# Patient Record
Sex: FEMALE | Race: WHITE | NOT HISPANIC OR LATINO | Employment: FULL TIME | URBAN - METROPOLITAN AREA
[De-identification: names, ages, dates, MRNs, and addresses within clinical notes are randomized per-mention and may not be internally consistent; named-entity substitution may affect disease eponyms.]

---

## 2020-08-19 ENCOUNTER — DOCUMENTATION ONLY (OUTPATIENT)
Dept: NEUROLOGY | Facility: HOSPITAL | Age: 50
End: 2020-08-19

## 2020-08-19 ENCOUNTER — TELEPHONE (OUTPATIENT)
Dept: NEUROLOGY | Facility: HOSPITAL | Age: 50
End: 2020-08-19

## 2020-08-19 DIAGNOSIS — C7A.8 PRIMARY MALIGNANT NEUROENDOCRINE NEOPLASM OF COLON: Primary | ICD-10-CM

## 2020-08-19 NOTE — PROGRESS NOTES
New NET Small Bowel dx 7/2020 referred by Dr. Faiza Luther. Patient presented in ER with nausea, vomiting and abdominal pain. The patient had a CT scan showed a small bowel blockage. The patient underwent a lap colectomy and the mass we confirmed as NET Well Dif Ki67 2%. Patient states she is feeling better but still having some issues with eating and abdominal pain.Patient has been scheduled for scans, blood work and clinic appointment next week and verbalized understanding.

## 2020-08-19 NOTE — TELEPHONE ENCOUNTER
----- Message from Kathy Degroot sent at 8/19/2020 11:55 AM CDT -----  This referral was sent this morning and the notes have been scanned into media. The patient had a FDG PET and I was not sure if you would like a GA68 as well or blood work. The physician has sent the materials to pathology department already so they will need a path order put in as well. Please let me know if you would like additional testing done prior to an appointment.   ----- Message -----  From: Alexys Hooks  Sent: 8/19/2020  10:30 AM CDT  To: [facility] Neuroendocrine Clinical Support    Dr. Dayami Paige would like to refer the following pt to the NET program for a second opinion right colon mass. The referral and records are scanned into . For any inquires regarding the pt's referral, please contact the referring office at [phone].    Thank you   Alexys Gorman

## 2020-08-20 ENCOUNTER — DOCUMENTATION ONLY (OUTPATIENT)
Dept: NEUROLOGY | Facility: HOSPITAL | Age: 50
End: 2020-08-20

## 2020-08-24 ENCOUNTER — TELEPHONE (OUTPATIENT)
Dept: NEUROLOGY | Facility: HOSPITAL | Age: 50
End: 2020-08-24

## 2020-08-24 NOTE — TELEPHONE ENCOUNTER
----- Message from Kika Rodriguez sent at 8/24/2020 10:26 AM CDT -----  Contact: self [phone]  RR  - Patient is calling to reschedule her appointment. Please call

## 2020-09-01 ENCOUNTER — HOSPITAL ENCOUNTER (OUTPATIENT)
Dept: RADIOLOGY | Facility: HOSPITAL | Age: 50
Discharge: HOME | End: 2020-09-01
Attending: INTERNAL MEDICINE
Payer: COMMERCIAL

## 2020-09-01 ENCOUNTER — OFFICE VISIT (OUTPATIENT)
Dept: NEUROLOGY | Facility: HOSPITAL | Age: 50
End: 2020-09-01
Attending: INTERNAL MEDICINE
Payer: COMMERCIAL

## 2020-09-01 VITALS
TEMPERATURE: 98 F | BODY MASS INDEX: 25.87 KG/M2 | HEART RATE: 87 BPM | WEIGHT: 128.31 LBS | OXYGEN SATURATION: 99 % | HEIGHT: 59 IN | SYSTOLIC BLOOD PRESSURE: 106 MMHG | DIASTOLIC BLOOD PRESSURE: 69 MMHG

## 2020-09-01 DIAGNOSIS — R10.84 GENERALIZED ABDOMINAL PAIN: ICD-10-CM

## 2020-09-01 DIAGNOSIS — K59.00 CONSTIPATION, UNSPECIFIED CONSTIPATION TYPE: ICD-10-CM

## 2020-09-01 DIAGNOSIS — C7A.8 PRIMARY MALIGNANT NEUROENDOCRINE NEOPLASM OF COLON: ICD-10-CM

## 2020-09-01 DIAGNOSIS — C7A.012 MALIGNANT CARCINOID TUMOR OF ILEUM: ICD-10-CM

## 2020-09-01 PROCEDURE — 78815 NM PET 68GA DOTATATE WHOLE BODY: ICD-10-PCS | Mod: 26,PI,, | Performed by: RADIOLOGY

## 2020-09-01 PROCEDURE — 99215 OFFICE O/P EST HI 40 MIN: CPT | Mod: 25 | Performed by: INTERNAL MEDICINE

## 2020-09-01 PROCEDURE — A9587 GALLIUM GA-68: HCPCS

## 2020-09-01 PROCEDURE — 3008F PR BODY MASS INDEX (BMI) DOCUMENTED: ICD-10-PCS | Mod: CPTII,,, | Performed by: INTERNAL MEDICINE

## 2020-09-01 PROCEDURE — 3008F BODY MASS INDEX DOCD: CPT | Mod: CPTII,,, | Performed by: INTERNAL MEDICINE

## 2020-09-01 PROCEDURE — 78815 PET IMAGE W/CT SKULL-THIGH: CPT | Mod: 26,PI,, | Performed by: RADIOLOGY

## 2020-09-01 PROCEDURE — 99204 PR OFFICE/OUTPT VISIT, NEW, LEVL IV, 45-59 MIN: ICD-10-PCS | Mod: ,,, | Performed by: INTERNAL MEDICINE

## 2020-09-01 PROCEDURE — 99204 OFFICE O/P NEW MOD 45 MIN: CPT | Mod: ,,, | Performed by: INTERNAL MEDICINE

## 2020-09-01 PROCEDURE — 78815 PET IMAGE W/CT SKULL-THIGH: CPT | Mod: TC

## 2020-09-01 RX ORDER — FAMOTIDINE 10 MG/1
10 TABLET ORAL
COMMUNITY
End: 2021-07-26

## 2020-09-01 RX ORDER — ONDANSETRON 4 MG/1
TABLET, FILM COATED ORAL
COMMUNITY
Start: 2020-08-31 | End: 2022-01-31

## 2020-09-01 RX ORDER — TRAMADOL HYDROCHLORIDE 50 MG/1
TABLET ORAL
COMMUNITY
Start: 2020-08-31 | End: 2021-02-15

## 2020-09-01 RX ORDER — ACETAMINOPHEN 500 MG
1000 TABLET ORAL EVERY 6 HOURS PRN
COMMUNITY

## 2020-09-01 NOTE — LETTER
September 1, 2020        Elpidio Luther MD  [address]  [facility]  [place] [address]             [facility] [address] [place] [address]  Phone: [phone]  Fax: [phone]   Patient: Kathy Negro   MR Number: [id number]   YOB: 1970   Date of Visit: 9/1/2020       Dear Dr. Luther:    Thank you for referring Kathy Negro to me for evaluation. Attached you will find relevant portions of my assessment and plan of care.    If you have questions, please do not hesitate to call me. I look forward to following Kathy Negro along with you.    Sincerely,      Patrick Quintanilla DO, FACP            CC  No Recipients    Enclosure

## 2020-09-01 NOTE — PROGRESS NOTES
NOLANETS:  [facility]  A collaboration between [facility] and [facility]    PATIENT: Kathy Negro  MRN: [id number]  DATE: 9/1/2020      Diagnosis:   1. Malignant carcinoid tumor of ileum    2. Generalized abdominal pain    3. Constipation, unspecified constipation type        Chief Complaint: Carcinoid Tumor      Oncologic History:      Oncologic History Small intestine neuroendocrine tumor diagnosed 07/2020    Oncologic Treatment Ileocolic resection 07/2020    Pathology 07/2020:  Ileocolic resection-neuroendocrine tumor, grade 1 (carcinoid), Ki-67 2% T3, N1          Subjective:    Interval History: Ms. Negro is a 50 y.o. female who is seen as an initial visit for a small intestine neuroendocrine tumor.  Her history dates to 7/2020 when she sought medical attention for several days of abdominal pain, nausea and vomiting.  She underwent a CT scan which showed findings consistent with a partial small bowel obstruction.  There is a mass in the ileocecal valve measuring 2.1 cm.  She underwent a laparoscopic ileocolic resection with pathology showing a 2.3 cm neuroendocrine tumor, grade 1 with Ki-67 2% 6 out of 14 lymph nodes were positive for metastatic disease.  She underwent a gallium 68 PET-CT in 09/2020 which did not show any evidence of a somatostatin receptor avid tumor.    She states that since her surgery she is had some ongoing, intermittent abdominal pain in periodic nausea.  The pain was severe enough yesterday that she went to the ER and underwent a CT scan which was nonrevealing.  She does complain about constipation and her last bowel movement was 4 days ago.  Looking back, she had had abdominal pain, intermittently for the past 2 years.  She did not have any symptoms of diarrhea, flushing or wheezing.  Weight has been stable.  She has no other new complaints.        Past Medical History:   Past Medical  History:   Diagnosis Date    Constipation 9/1/2020    Generalized abdominal pain 9/1/2020    Malignant carcinoid tumor of ileum 9/1/2020       Past Surgical HIstory:   Past Surgical History:   Procedure Laterality Date    LAPAROSCOPIC COLECTOMY  07/2020       Family History:   Family History   Adopted: Yes       Social History:  reports that she has never smoked. She has never used smokeless tobacco. She reports previous alcohol use. She reports that she does not use drugs.    Allergies:  Review of patient's allergies indicates:  No Known Allergies    Medications:  Current Outpatient Medications   Medication Sig Dispense Refill    acetaminophen (TYLENOL) 500 MG tablet Take 1,000 mg by mouth every 6 (six) hours as needed.      famotidine (PEPCID) 10 MG tablet Take 10 mg by mouth.      ondansetron (ZOFRAN) 4 MG tablet       traMADoL (ULTRAM) 50 mg tablet        No current facility-administered medications for this visit.        Review of Systems   Constitutional: Negative for chills, fever and unexpected weight change.   HENT: Negative for congestion, hearing loss and nosebleeds.    Eyes: Negative for visual disturbance.   Respiratory: Negative for cough and shortness of breath.    Cardiovascular: Negative for chest pain and palpitations.   Gastrointestinal: Positive for abdominal pain and constipation. Negative for blood in stool, diarrhea, nausea and vomiting.   Genitourinary: Negative for dysuria.   Musculoskeletal: Negative for back pain and gait problem.   Skin: Negative for color change and rash.   Neurological: Negative for dizziness, weakness and headaches.   Hematological: Negative for adenopathy. Does not bruise/bleed easily.   Psychiatric/Behavioral: Negative for confusion.       ECOG Performance Status: 0   Objective:      Vitals:   Vitals:    09/01/20 1551   BP: 106/69   BP Location: Right arm   Patient Position: Sitting   BP Method: Medium (Automatic)   Pulse: 87   Temp: 97.9 °F (36.6 °C)  "  TempSrc: Oral   SpO2: 99%   Weight: 58.2 kg (128 lb 4.9 oz)   Height: 4' 11" (1.499 m)     BMI: Body mass index is 25.91 kg/m².    Physical Exam  Constitutional:       General: She is not in acute distress.     Appearance: She is well-developed.   HENT:      Head: Normocephalic.      Mouth/Throat:      Pharynx: No oropharyngeal exudate.   Eyes:      General: No scleral icterus.  Neck:      Musculoskeletal: Neck supple.      Thyroid: No thyromegaly.      Trachea: No tracheal deviation.   Cardiovascular:      Rate and Rhythm: Normal rate and regular rhythm.   Pulmonary:      Effort: Pulmonary effort is normal. No respiratory distress.      Breath sounds: Normal breath sounds. No wheezing or rales.   Abdominal:      General: There is no distension.      Palpations: Abdomen is soft. There is no mass.      Tenderness: There is no abdominal tenderness. There is no guarding or rebound.   Musculoskeletal: Normal range of motion.   Lymphadenopathy:      Cervical: No cervical adenopathy.   Skin:     General: Skin is warm and dry.   Neurological:      Mental Status: She is alert and oriented to person, place, and time.      Cranial Nerves: No cranial nerve deficit.         Laboratory Data:  No results found for any previous visit.       Imaging:     Gallium 68 PET-CT 09/01/2020  COMPARISON:  Outside CT abdomen pelvis with contrast 08/31/2020 (report only); outside FDG PET-CT 08/07/2020 (report only)     FINDINGS:  Quality of the study: Adequate.     Physiologic distribution of the tracer is seen within the pituitary, salivary, and thyroid glands, stomach, liver, pancreas uncinate process, spleen, adrenal glands, and bowel as well as within the  tract, noting pooling within the right ureter.  There is faint radiotracer uptake in the right upper quadrant near the site of prior surgical resection without focal CT correlate.     Incidental CT findings: Postoperative changes of previous ileal and right partial hemicolonic " resection.  Probable hepatic steatosis.  Hyperattenuating material within the gallbladder favored to represent vicarious excretion of contrast related to recent outside contrast exam.  1.4 cm hypodense left adnexal cystic lesion.     Impression:     No PET/CT findings to suggest somatostatin receptor avid disease.            Assessment:       1. Malignant carcinoid tumor of ileum    2. Generalized abdominal pain    3. Constipation, unspecified constipation type           Plan:     I have had a long conversation with Mrs. Negro concerning her disease.  She has a small intestine neuroendocrine tumor which was completely resected in 7/2020.  Her preop scans and scans today showed no evidence of distant disease.  Her pathology did indicate a grade 1 neuroendocrine tumor with Ki-67 of 2%.  There are additional findings on the pathology report indicating this is a large cell neuroendocrine carcinoma, however, I suspect this was an error.  I will have her pathology reviewed at our institution.  Additionally, we did draw neuroendocrine specific tumor markers today that we will follow-up on.  She is having ongoing abdominal pain which I do think is likely related to constipation.  She is on tramadol and Zofran which I have told her can cause significant constipation.  I have recommended an aggressive bowel regimen with a stool softener and laxative and she may need follow-up with GI should she have ongoing symptoms.  I have told her she will need ongoing surveillance for her neuroendocrine tumor and would recommend we repeat a CT and tumor markers in another 6 months and we will plan to see her back at that time.  She is going to follow back up with Dr. Luther.  Report any new symptoms in the interim.  All questions were answered and she is agreeable with this plan.      Patrick Quintanilla DO, FACP  Hematology & Oncology, [facility]  [address]  [place]  [address]  ph.  [phone]; [phone]  fax. [phone]      45 minutes were spent in coordination of patient's care, record review and counseling.  More than 50% of the time was face-to-face.

## 2020-09-02 LAB

## 2020-10-09 ENCOUNTER — TELEPHONE (OUTPATIENT)
Dept: NEUROLOGY | Facility: HOSPITAL | Age: 50
End: 2020-10-09

## 2020-10-14 ENCOUNTER — TELEPHONE (OUTPATIENT)
Dept: NEUROLOGY | Facility: HOSPITAL | Age: 50
End: 2020-10-14

## 2021-01-08 ENCOUNTER — TELEPHONE (OUTPATIENT)
Dept: NEUROLOGY | Facility: HOSPITAL | Age: 51
End: 2021-01-08

## 2021-01-11 ENCOUNTER — TELEPHONE (OUTPATIENT)
Dept: NEUROLOGY | Facility: HOSPITAL | Age: 51
End: 2021-01-11

## 2021-02-12 ENCOUNTER — TELEPHONE (OUTPATIENT)
Dept: NEUROLOGY | Facility: HOSPITAL | Age: 51
End: 2021-02-12

## 2021-02-15 ENCOUNTER — TELEPHONE (OUTPATIENT)
Dept: NEUROLOGY | Facility: HOSPITAL | Age: 51
End: 2021-02-15

## 2021-02-15 ENCOUNTER — OFFICE VISIT (OUTPATIENT)
Dept: NEUROLOGY | Facility: HOSPITAL | Age: 51
End: 2021-02-15
Attending: INTERNAL MEDICINE
Payer: COMMERCIAL

## 2021-02-15 ENCOUNTER — HOSPITAL ENCOUNTER (OUTPATIENT)
Dept: RADIOLOGY | Facility: HOSPITAL | Age: 51
Discharge: HOME | End: 2021-02-15
Attending: INTERNAL MEDICINE
Payer: COMMERCIAL

## 2021-02-15 VITALS
HEART RATE: 65 BPM | WEIGHT: 140.13 LBS | SYSTOLIC BLOOD PRESSURE: 110 MMHG | TEMPERATURE: 98 F | HEIGHT: 59 IN | BODY MASS INDEX: 28.25 KG/M2 | DIASTOLIC BLOOD PRESSURE: 74 MMHG | OXYGEN SATURATION: 99 %

## 2021-02-15 DIAGNOSIS — C7A.012 MALIGNANT CARCINOID TUMOR OF ILEUM: Primary | ICD-10-CM

## 2021-02-15 PROCEDURE — 25500020 PHARM REV CODE 255: Performed by: INTERNAL MEDICINE

## 2021-02-15 PROCEDURE — 99213 OFFICE O/P EST LOW 20 MIN: CPT | Mod: ,,, | Performed by: INTERNAL MEDICINE

## 2021-02-15 PROCEDURE — 3008F BODY MASS INDEX DOCD: CPT | Mod: ,,, | Performed by: INTERNAL MEDICINE

## 2021-02-15 PROCEDURE — 74177 CT ABD & PELVIS W/CONTRAST: CPT | Mod: 26,,, | Performed by: INTERNAL MEDICINE

## 2021-02-15 PROCEDURE — 99213 PR OFFICE/OUTPT VISIT, EST, LEVL III, 20-29 MIN: ICD-10-PCS | Mod: ,,, | Performed by: INTERNAL MEDICINE

## 2021-02-15 PROCEDURE — 99214 OFFICE O/P EST MOD 30 MIN: CPT | Mod: 25 | Performed by: INTERNAL MEDICINE

## 2021-02-15 PROCEDURE — 74177 CT ABD & PELVIS W/CONTRAST: CPT | Mod: TC

## 2021-02-15 PROCEDURE — 3008F PR BODY MASS INDEX (BMI) DOCUMENTED: ICD-10-PCS | Mod: ,,, | Performed by: INTERNAL MEDICINE

## 2021-02-15 PROCEDURE — 1126F AMNT PAIN NOTED NONE PRSNT: CPT | Mod: ,,, | Performed by: INTERNAL MEDICINE

## 2021-02-15 PROCEDURE — 1126F PR PAIN SEVERITY QUANTIFIED, NO PAIN PRESENT: ICD-10-PCS | Mod: ,,, | Performed by: INTERNAL MEDICINE

## 2021-02-15 PROCEDURE — 74177 CT ABDOMEN PELVIS WITH CONTRAST: ICD-10-PCS | Mod: 26,,, | Performed by: INTERNAL MEDICINE

## 2021-02-15 RX ORDER — DOCUSATE SODIUM 100 MG/1
100 CAPSULE, LIQUID FILLED ORAL
COMMUNITY
End: 2022-01-31

## 2021-02-15 RX ADMIN — IOHEXOL 75 ML: 350 INJECTION, SOLUTION INTRAVENOUS at 08:02

## 2021-03-09 ENCOUNTER — PATIENT MESSAGE (OUTPATIENT)
Dept: NEUROLOGY | Facility: HOSPITAL | Age: 51
End: 2021-03-09

## 2021-03-10 ENCOUNTER — TELEPHONE (OUTPATIENT)
Dept: NEUROLOGY | Facility: HOSPITAL | Age: 51
End: 2021-03-10

## 2021-03-10 DIAGNOSIS — C7A.012 MALIGNANT CARCINOID TUMOR OF ILEUM: Primary | ICD-10-CM

## 2021-07-26 ENCOUNTER — HOSPITAL ENCOUNTER (OUTPATIENT)
Dept: RADIOLOGY | Facility: HOSPITAL | Age: 51
Discharge: HOME | End: 2021-07-26
Attending: SURGERY
Payer: COMMERCIAL

## 2021-07-26 ENCOUNTER — OFFICE VISIT (OUTPATIENT)
Dept: NEUROLOGY | Facility: HOSPITAL | Age: 51
End: 2021-07-26
Attending: SURGERY
Payer: COMMERCIAL

## 2021-07-26 DIAGNOSIS — C7A.012 MALIGNANT CARCINOID TUMOR OF ILEUM: ICD-10-CM

## 2021-07-26 DIAGNOSIS — C26.9 MALIGNANT NEOPLASM OF ILL-DEFINED SITES WITHIN THE DIGESTIVE SYSTEM: ICD-10-CM

## 2021-07-26 DIAGNOSIS — C7B.8 SECONDARY NEUROENDOCRINE TUMOR OF DISTANT LYMPH NODES: ICD-10-CM

## 2021-07-26 DIAGNOSIS — C7A.095 MALIGNANT CARCINOID TUMOR OF MIDGUT: Primary | ICD-10-CM

## 2021-07-26 PROCEDURE — A9585 GADOBUTROL INJECTION: HCPCS | Performed by: SURGERY

## 2021-07-26 PROCEDURE — 74183 MRI ABD W/O CNTR FLWD CNTR: CPT | Mod: 26,,, | Performed by: RADIOLOGY

## 2021-07-26 PROCEDURE — 74183 MRI ABD W/O CNTR FLWD CNTR: CPT | Mod: TC

## 2021-07-26 PROCEDURE — 25500020 PHARM REV CODE 255: Performed by: SURGERY

## 2021-07-26 PROCEDURE — 74183 MRI ABDOMEN W WO CONTRAST: ICD-10-PCS | Mod: 26,,, | Performed by: RADIOLOGY

## 2021-07-26 RX ORDER — GADOBUTROL 604.72 MG/ML
10 INJECTION INTRAVENOUS
Status: COMPLETED | OUTPATIENT
Start: 2021-07-26 | End: 2021-07-26

## 2021-07-26 RX ADMIN — GADOBUTROL 10 ML: 604.72 INJECTION INTRAVENOUS at 10:07

## 2021-07-28 ENCOUNTER — PATIENT MESSAGE (OUTPATIENT)
Dept: NEUROLOGY | Facility: HOSPITAL | Age: 51
End: 2021-07-28

## 2021-07-28 LAB

## 2022-01-31 ENCOUNTER — TELEPHONE (OUTPATIENT)
Dept: NEUROLOGY | Facility: HOSPITAL | Age: 52
End: 2022-01-31
Payer: COMMERCIAL

## 2022-01-31 ENCOUNTER — OFFICE VISIT (OUTPATIENT)
Dept: NEUROLOGY | Facility: HOSPITAL | Age: 52
End: 2022-01-31
Attending: SURGERY
Payer: COMMERCIAL

## 2022-01-31 ENCOUNTER — HOSPITAL ENCOUNTER (OUTPATIENT)
Dept: RADIOLOGY | Facility: HOSPITAL | Age: 52
Discharge: HOME | End: 2022-01-31
Attending: SURGERY
Payer: COMMERCIAL

## 2022-01-31 VITALS
HEIGHT: 59 IN | RESPIRATION RATE: 20 BRPM | WEIGHT: 138.44 LBS | SYSTOLIC BLOOD PRESSURE: 123 MMHG | HEART RATE: 77 BPM | BODY MASS INDEX: 27.91 KG/M2 | DIASTOLIC BLOOD PRESSURE: 73 MMHG | TEMPERATURE: 99 F

## 2022-01-31 DIAGNOSIS — C7B.8 SECONDARY NEUROENDOCRINE TUMOR OF DISTANT LYMPH NODES: ICD-10-CM

## 2022-01-31 DIAGNOSIS — C7A.095 MALIGNANT CARCINOID TUMOR OF MIDGUT: Primary | ICD-10-CM

## 2022-01-31 DIAGNOSIS — C26.9 MALIGNANT NEOPLASM OF ILL-DEFINED SITES WITHIN THE DIGESTIVE SYSTEM: ICD-10-CM

## 2022-01-31 LAB
CREAT SERPL-MCNC: 0.7 MG/DL (ref 0.5–1.4)
SAMPLE: NORMAL

## 2022-01-31 PROCEDURE — 25500020 PHARM REV CODE 255: Performed by: SURGERY

## 2022-01-31 PROCEDURE — 74177 CT ABDOMEN PELVIS WITH CONTRAST: ICD-10-PCS | Mod: 26,,, | Performed by: RADIOLOGY

## 2022-01-31 PROCEDURE — 74177 CT ABD & PELVIS W/CONTRAST: CPT | Mod: 26,,, | Performed by: RADIOLOGY

## 2022-01-31 PROCEDURE — A9698 NON-RAD CONTRAST MATERIALNOC: HCPCS | Performed by: SURGERY

## 2022-01-31 PROCEDURE — 99213 OFFICE O/P EST LOW 20 MIN: CPT | Mod: 25 | Performed by: SURGERY

## 2022-01-31 PROCEDURE — 74177 CT ABD & PELVIS W/CONTRAST: CPT | Mod: TC

## 2022-01-31 RX ORDER — METOCLOPRAMIDE 10 MG/1
10 TABLET ORAL
Qty: 120 TABLET | Refills: 0 | Status: SHIPPED | OUTPATIENT
Start: 2022-01-31 | End: 2022-03-02

## 2022-01-31 RX ADMIN — IOHEXOL 1000 ML: 9 SOLUTION ORAL at 10:01

## 2022-01-31 RX ADMIN — IOHEXOL 75 ML: 350 INJECTION, SOLUTION INTRAVENOUS at 11:01

## 2022-01-31 NOTE — LETTER
January 31, 2022    Kathy Negro  [address] [place] [address]       NOLANETS: [facility]  A collaboration between [facility] and [facility]                [address]  [address]  [place] [address]  Phone: [phone]  Fax: [phone]      URIEL Corona M.D., FACS  Michael Medina M.D.   Moe Pina M.D., FACS  DO Kathy Pereira M.D., MPH, FACS           Re: Kathy Negro     To Whom It May Concern.    Ms Kathy Negro was at her appointment today 1/31/2022 at 200 pm  Please excuse her from work.    Sincerely,             Kathy Noyola M.D., MPH, FACS  Professor of Surgery, [facility]

## 2022-01-31 NOTE — PROGRESS NOTES
NOLANETS:  [facility]  A collaboration between [facility] and [facility]    PATIENT: Kathy Negro  MRN: [id number]  DATE: 1/31/2022    Diagnosis:   Node positive midgut NET    Chief Complaint: node positive midgut NET    Oncologic History:      Oncologic History Small intestine neuroendocrine tumor diagnosed 07/2020    Oncologic Treatment Ileocolic resection 07/2020    Pathology 07/2020:  Ileocolic resection-neuroendocrine tumor, grade 1 (carcinoid), Ki-67 2% T3, N1 (6/14)            Subjective:    Interval History: Ms. Negro is a 51 y.o. female who is seen in follow-up for a small intestine neuroendocrine tumor.  She states that she still has some ongoing, intermittent constipation but does have a bowel regimen that works for her when this happens.  She denies any from the abdominal pain.  She is otherwise active without new complaints.    Her history dates to 7/2020 when she sought medical attention for several days of abdominal pain, nausea and vomiting.  She underwent a CT scan which showed findings consistent with a partial small bowel obstruction.  There is a mass in the ileocecal valve measuring 2.1 cm.  She underwent a laparoscopic ileocolic resection with pathology showing a 2.3 cm neuroendocrine tumor, grade 1 with Ki-67 2% 6 out of 14 lymph nodes were positive for metastatic disease.  She underwent a gallium 68 PET-CT in 09/2020 which did not show any evidence of a somatostatin receptor avid tumor.        Past Medical History:   Past Medical History:   Diagnosis Date    Constipation 9/1/2020    Generalized abdominal pain 9/1/2020    Malignant carcinoid tumor of ileum 9/1/2020       Past Surgical HIstory:   Past Surgical History:   Procedure Laterality Date    LAPAROSCOPIC COLECTOMY  07/2020       Family History:   Family History   Adopted: Yes       Social History:  reports that she has never smoked. She  has never used smokeless tobacco. She reports previous alcohol use. She reports that she does not use drugs.    Allergies:  Review of patient's allergies indicates:  No Known Allergies    Medications:  Current Outpatient Medications   Medication Sig Dispense Refill    acetaminophen (TYLENOL) 500 MG tablet Take 1,000 mg by mouth every 6 (six) hours as needed.      docusate sodium (COLACE) 100 MG capsule Take 100 mg by mouth.      ondansetron (ZOFRAN) 4 MG tablet        No current facility-administered medications for this visit.       Review of Systems   Constitutional: Negative for chills, fever and unexpected weight change.   HENT: Negative for congestion, hearing loss and nosebleeds.    Eyes: Negative for visual disturbance.   Respiratory: Negative for cough and shortness of breath.    Cardiovascular: Negative for chest pain and palpitations.   Gastrointestinal: Positive for constipation. Negative for abdominal pain, blood in stool, diarrhea, nausea and vomiting.   Genitourinary: Negative for dysuria.   Musculoskeletal: Negative for back pain and gait problem.   Skin: Negative for color change and rash.   Neurological: Negative for dizziness, weakness and headaches.   Hematological: Negative for adenopathy. Does not bruise/bleed easily.   Psychiatric/Behavioral: Negative for confusion.       ECOG Performance Status: 0   Objective:      Vitals:   There were no vitals filed for this visit.  BMI: There is no height or weight on file to calculate BMI.    Physical Exam  Constitutional:       General: She is not in acute distress.     Appearance: Normal appearance. She is well-developed and normal weight. She is not ill-appearing, toxic-appearing or diaphoretic.   HENT:      Head: Normocephalic and atraumatic.   Eyes:      General: No scleral icterus.  Neck:      Thyroid: No thyromegaly.      Trachea: No tracheal deviation.   Cardiovascular:      Rate and Rhythm: Normal rate and regular rhythm.   Pulmonary:       Effort: Pulmonary effort is normal. No respiratory distress.      Breath sounds: No stridor.   Abdominal:      Palpations: Abdomen is soft.   Musculoskeletal:         General: No swelling.   Skin:     General: Skin is warm and dry.   Neurological:      Mental Status: She is alert and oriented to person, place, and time.   Psychiatric:         Mood and Affect: Mood normal.         Behavior: Behavior normal.         Thought Content: Thought content normal.         Judgment: Judgment normal.         Laboratory Data:  Neuroendocrine Labs Latest Ref Rng & Units 1/31/2022   GLUCOSE 70 - 110 mg/dL 87   BUN 6 - 20 mg/dL 9   CREATININE 0.5 - 1.4 mg/dL 0.7    - 145 mmol/L 138   K 3.5 - 5.1 mmol/L 4.4   CHLORIDE 95 - 110 mmol/L 109   CO2 23 - 29 mmol/L 22 (L)   CALCIUM 8.7 - 10.5 mg/dL 9.1   PROTEIN, TOTAL 6.0 - 8.4 g/dL 7.0   ALBUMIN 3.5 - 5.2 g/dL 3.9   TOTAL BILIRUBIN 0.1 - 1.0 mg/dL 0.3   ALK PHOSPHATASE 55 - 135 U/L 57   SGOT (AST) 10 - 40 U/L 15   SGPT (ALT) 10 - 44 U/L 16     Neuroendocrine Labs Latest Ref Rng & Units 7/26/2021   5 HIAA BLOOD Up to 22 ng/mL 6   EXT 5 HIAA BLOOD 0 - 22 ng/mL    SEROTONIN <=230 ng/mL 78   PANCREASTATIN 10 - 135 pg/mL 71     Neuroendocrine Labs Latest Ref Rng & Units 2/15/2021   5 HIAA BLOOD Up to 22 ng/mL 5   SEROTONIN <=230 ng/mL 100   PANCREASTATIN 10 - 135 pg/mL 65   EXT PANCREASTATIN SOPHIA 10 - 135 pg/mL    NEUROKININ A 0 - 40 pg/mL 6       Imaging:   CT Abdomen Pelvis With Contrast  Narrative: EXAMINATION:  CT ABDOMEN PELVIS WITH CONTRAST    CLINICAL HISTORY:  Gastrointestinal cancer, surveillance;Malignant neoplasm of ill-defined sites within the digestive system    TECHNIQUE:  Multiphase postcontrast images were obtained through the abdomen and pelvis per protocol.  Coronal and sagittal images were reviewed.  1000 cc oral contrast and 75 cc intravenous contrast was administered.    COMPARISON:  MRI abdomen with and without contrast dated 07/26/2021 and CT abdomen pelvis with  contrast dated 02/15/2021    FINDINGS:  Limited images through the lower chest are unremarkable.    Abdomen and pelvis:    There is vague enhancing focus in the peripheral right lobe measuring 0.6 cm (series 5, image 31).  This finding was previously demonstrated on comparison MRI with imaging characteristics most suggestive of hemangioma at that time.  No new liver lesion.  Intrahepatic vasculature is patent.  Cholelithiasis.  The pancreas, adrenal glands, kidneys, and urinary bladder are unremarkable.  The uterus is present.  Low-density right adnexal structure measuring 4.6 x 4.4 cm (series 5, image 114).  Additional smaller left adnexal structure measuring up to 1.8 cm (series 5, image 122).    Prior partial colectomy.  The GI tract is normal in caliber.  Appendix is not visualized.  Scattered stool of the descending and sigmoid colon.  Trace intrapelvic free fluid, nonspecific and possibly physiologic.  No adenopathy.  There is 0.4 cm subcutaneous nodular focus just anterior to the left rectus musculature (series 5, image 73).    No aggressive osseous lesion.  Impression: Postsurgical change of partial colectomy.  No convincing evidence for recurrent or metastatic disease.    Subcutaneous nodular focus just anterior to the left rectus musculature, technically indeterminate.  Close attention at follow-up.    Decreased conspicuity of subcentimeter right lobe lesion previously characterized as liver hemangioma.    Probable bilateral ovarian cysts, largest on the right measuring up to 4.6 cm.  Further correlation with pelvic ultrasound could be obtained to ensure cystic nature.    Cholelithiasis and additional findings above.    Electronically signed by: Lito Agudelo  Date:    01/31/2022  Time:    12:56      MRI Abdomen W WO Contrast  Narrative: EXAMINATION:  MRI ABDOMEN W WO CONTRAST    CLINICAL HISTORY:  restage;  Malignant carcinoid tumor of the ileum    TECHNIQUE:  Multiplanar, multi-sequence MR imaging of  the abdomen was performed before and after administration of 10 cc of Gadavist gadolinium-based intravenous contrast per the liver protocol.    COMPARISON:  CT abdomen pelvis with contrast dated 02/15/2021 and PET-CT dated 09/01/2020    FINDINGS:  Pulmonary Bases: No pleural effusion.    Liver: No abnormal focus of diffusion restriction.  Mild signal dropout on opposed phase sequences, most suggestive of steatosis.  There is 0.7 cm T2 hyperintense focus which demonstrates irregular marginal arterial enhancement with fill-in suggesting, most suggestive of small hemangioma (series 1300, image 54).  Additional non-masslike focal wedge-shaped arterial area of enhancement in the peripheral right posterior segment (series 11, image 40), isointense on subsequent post-contrast sequences nonspecific and possibly perfusional.  Hepatic vasculature is patent.    Bile Ducts: No biliary dilatation    Gallbladder: normal    Pancreas: normal.    Spleen: normal.    Proximal Gastrointestinal tract: Prior postoperative change of the right colon.  Normal upper GI caliber.    Mesentery: No discrete mesenteric mass.    Adrenal Glands: normal.    Kidneys: Enhance symmetrically without hydronephrosis.    Aorta and Abdominal Vasculature: The aorta, IVC, SMV and splenic vein appear patent.    Lymph nodes: None pathologically enlarged.    Body wall: No acute abnormality.    Other: No free fluid.  No suspicious marrow enhancing lesion.  Impression: Intrahepatic subcentimeter lesion with features most suggestive of small hemangioma.  No evidence for recurrent or metastatic disease.    Hepatic steatosis and additional findings as above.    There are no measurable lesions per RECIST criteria.    Electronically signed by: Lito Agudelo  Date:    07/26/2021  Time:    14:15           Gallium 68 PET-CT 09/01/2020  COMPARISON:  Outside CT abdomen pelvis with contrast 08/31/2020 (report only); outside FDG PET-CT 08/07/2020 (report  only)     FINDINGS:  Quality of the study: Adequate.     Physiologic distribution of the tracer is seen within the pituitary, salivary, and thyroid glands, stomach, liver, pancreas uncinate process, spleen, adrenal glands, and bowel as well as within the  tract, noting pooling within the right ureter.  There is faint radiotracer uptake in the right upper quadrant near the site of prior surgical resection without focal CT correlate.     Incidental CT findings: Postoperative changes of previous ileal and right partial hemicolonic resection.  Probable hepatic steatosis.  Hyperattenuating material within the gallbladder favored to represent vicarious excretion of contrast related to recent outside contrast exam.  1.4 cm hypodense left adnexal cystic lesion.     Impression:     No PET/CT findings to suggest somatostatin receptor avid disease.            Assessment:              Plan:     I had a long conversation with the patient about the features of her case that support the treatment and surveillance recommendations outlined below:  1. She had a grade 1, well-differentiated, midgut carcinoid tumor resected in July 2020. I reviewed with her the natural history of this condition including a slow in indolent biologic behavior and patients can live a long time even when all measurable disease is not removed or in the setting of metastatic disease.  The less favorable aspects of this condition are an ongoing risk of local regional recurrence within the small bowel, small bowel mesentery, or retroperitoneal lymph node basins or the development of distant metastatic disease even years after complete resection of the primary tumor.  Our understanding of her case was that she had all measurable disease removed at the time of her initial operation.  2. She had a somatostatin receptor PET done in the postoperative period.  That showed no evidence of somatostatin receptor avid disease within the chest her abdomen.  This would  be a relevant negative in a patient with node positive disease.  3. She had a CT scan the abdomen pelvis with contrast prior to this visit that does not show any findings to suggest recurrence or metastases. She has bilateral adnexal/ovarian cysts that I showed her in the office. They were not present on a prior scan, so I recommended follow up with her gyn. The hypervascular area in the liver is not mentioned, but has been evaluated previously by MRI and felt to be a hemangioma.  4. She had neuroendocrine blood work that was within normal limits but drawn postoperatively.  I have updated neuroendocrine blood work pending.      Plan:  CT A/P with contrast in 6 months  Neuroendocrine blood work in 6 months  Recommend follow up with GYN for adnexal cysts.    Kathy Silva MD, MPH, FACS  Professor of Surgery, Surgical Oncology, and Hepatobiliary Oncology  Medical Director, [place] [address]   [place] [address]  Phone: [phone]  Phone: [phone]  Fax: [phone]  Email: [email or web address]

## 2022-01-31 NOTE — TELEPHONE ENCOUNTER
----- Message from Nicky Clemons sent at 1/31/2022  4:02 PM CST -----  Contact: [phone]/ Self  MM    Type: Requesting to speak with nurse    Who Called: Pt   Regarding: requesting a doctor's excuse note    Would the patient rather a call back or a response via [facility]? Call back  Best Call Back Number: [phone]  Additional Information: E-mail to  [email or web address]

## 2022-02-07 ENCOUNTER — TELEPHONE (OUTPATIENT)
Dept: NEUROLOGY | Facility: HOSPITAL | Age: 52
End: 2022-02-07
Payer: COMMERCIAL

## 2022-02-07 NOTE — TELEPHONE ENCOUNTER
----- Message from Jenn Anderson sent at 2/7/2022  4:06 PM CST -----  Contact: [phone]/self  Who Called: PT  Regarding: needs copy of  notes from visit regarding cyst on ovaries  Would the patient rather a call back or a response via [facility]? Call back  Best Call Back Number: [phone]  Additional Information: pt has OBGYN appt daphney at 1pm needs to takes notes to appt   She would like them emailed if possible [email or web address]

## 2022-02-07 NOTE — TELEPHONE ENCOUNTER
Return pt call and told her that I will email it to her first thing in the morning. She verbalized understanding

## 2022-07-08 ENCOUNTER — TELEPHONE (OUTPATIENT)
Dept: NEUROLOGY | Facility: HOSPITAL | Age: 52
End: 2022-07-08
Payer: COMMERCIAL

## 2022-07-08 NOTE — TELEPHONE ENCOUNTER
----- Message from Jackie Hameed sent at 7/8/2022  2:14 PM CDT -----  Contact: [phone]  Type:  Needs Medical Advice    Who Called: pt   Reason for call: regarding canceling her appts for 7/18 due to starting a New job  Would the patient rather a call back or a response via [facility]? Call back  Best Call Back Number: [phone]

## 2022-07-08 NOTE — TELEPHONE ENCOUNTER
Pt cancel appts on 7/18/22, started new job, unable to come. Pt notified Dr. Silva no longer in this clinic or with [facility].  Pt will contact to reschedule when available.